# Patient Record
Sex: MALE | Race: BLACK OR AFRICAN AMERICAN | URBAN - METROPOLITAN AREA
[De-identification: names, ages, dates, MRNs, and addresses within clinical notes are randomized per-mention and may not be internally consistent; named-entity substitution may affect disease eponyms.]

---

## 2024-02-05 ENCOUNTER — LAB ENCOUNTER (OUTPATIENT)
Dept: LAB | Age: 54
End: 2024-02-05
Payer: COMMERCIAL

## 2024-02-05 ENCOUNTER — OFFICE VISIT (OUTPATIENT)
Dept: FAMILY MEDICINE CLINIC | Facility: CLINIC | Age: 54
End: 2024-02-05
Payer: COMMERCIAL

## 2024-02-05 VITALS
HEIGHT: 68 IN | SYSTOLIC BLOOD PRESSURE: 122 MMHG | WEIGHT: 187 LBS | TEMPERATURE: 97 F | HEART RATE: 85 BPM | DIASTOLIC BLOOD PRESSURE: 80 MMHG | OXYGEN SATURATION: 97 % | BODY MASS INDEX: 28.34 KG/M2

## 2024-02-05 DIAGNOSIS — R53.83 FATIGUE, UNSPECIFIED TYPE: ICD-10-CM

## 2024-02-05 DIAGNOSIS — Z12.5 PROSTATE CANCER SCREENING: ICD-10-CM

## 2024-02-05 DIAGNOSIS — F32.A ANXIETY AND DEPRESSION: ICD-10-CM

## 2024-02-05 DIAGNOSIS — Z00.00 ENCOUNTER FOR ANNUAL PHYSICAL EXAM: ICD-10-CM

## 2024-02-05 DIAGNOSIS — Z76.89 ENCOUNTER TO ESTABLISH CARE: Primary | ICD-10-CM

## 2024-02-05 DIAGNOSIS — F41.9 ANXIETY AND DEPRESSION: ICD-10-CM

## 2024-02-05 DIAGNOSIS — Z71.6 ENCOUNTER FOR SMOKING CESSATION COUNSELING: ICD-10-CM

## 2024-02-05 DIAGNOSIS — Z12.11 COLON CANCER SCREENING: ICD-10-CM

## 2024-02-05 LAB
ALBUMIN SERPL-MCNC: 4.5 G/DL (ref 3.2–4.8)
ALBUMIN/GLOB SERPL: 1.6 {RATIO} (ref 1–2)
ALP LIVER SERPL-CCNC: 50 U/L
ALT SERPL-CCNC: 31 U/L
ANION GAP SERPL CALC-SCNC: 6 MMOL/L (ref 0–18)
AST SERPL-CCNC: 29 U/L (ref ?–34)
BASOPHILS # BLD AUTO: 0.04 X10(3) UL (ref 0–0.2)
BASOPHILS NFR BLD AUTO: 1.1 %
BILIRUB SERPL-MCNC: 0.5 MG/DL (ref 0.3–1.2)
BUN BLD-MCNC: 12 MG/DL (ref 9–23)
BUN/CREAT SERPL: 10.3 (ref 10–20)
CALCIUM BLD-MCNC: 9.9 MG/DL (ref 8.7–10.4)
CHLORIDE SERPL-SCNC: 109 MMOL/L (ref 98–112)
CHOLEST SERPL-MCNC: 174 MG/DL (ref ?–200)
CO2 SERPL-SCNC: 29 MMOL/L (ref 21–32)
COMPLEXED PSA SERPL-MCNC: 0.44 NG/ML (ref ?–4)
CREAT BLD-MCNC: 1.16 MG/DL
DEPRECATED RDW RBC AUTO: 40 FL (ref 35.1–46.3)
EGFRCR SERPLBLD CKD-EPI 2021: 75 ML/MIN/1.73M2 (ref 60–?)
EOSINOPHIL # BLD AUTO: 0.09 X10(3) UL (ref 0–0.7)
EOSINOPHIL NFR BLD AUTO: 2.5 %
ERYTHROCYTE [DISTWIDTH] IN BLOOD BY AUTOMATED COUNT: 13.1 % (ref 11–15)
FASTING PATIENT LIPID ANSWER: YES
FASTING STATUS PATIENT QL REPORTED: YES
GLOBULIN PLAS-MCNC: 2.9 G/DL (ref 2.8–4.4)
GLUCOSE BLD-MCNC: 92 MG/DL (ref 70–99)
HCT VFR BLD AUTO: 44.2 %
HDLC SERPL-MCNC: 51 MG/DL (ref 40–59)
HGB BLD-MCNC: 14 G/DL
IMM GRANULOCYTES # BLD AUTO: 0.01 X10(3) UL (ref 0–1)
IMM GRANULOCYTES NFR BLD: 0.3 %
LDLC SERPL CALC-MCNC: 106 MG/DL (ref ?–100)
LYMPHOCYTES # BLD AUTO: 1.5 X10(3) UL (ref 1–4)
LYMPHOCYTES NFR BLD AUTO: 42 %
MCH RBC QN AUTO: 26.8 PG (ref 26–34)
MCHC RBC AUTO-ENTMCNC: 31.7 G/DL (ref 31–37)
MCV RBC AUTO: 84.7 FL
MONOCYTES # BLD AUTO: 0.31 X10(3) UL (ref 0.1–1)
MONOCYTES NFR BLD AUTO: 8.7 %
NEUTROPHILS # BLD AUTO: 1.62 X10 (3) UL (ref 1.5–7.7)
NEUTROPHILS # BLD AUTO: 1.62 X10(3) UL (ref 1.5–7.7)
NEUTROPHILS NFR BLD AUTO: 45.4 %
NONHDLC SERPL-MCNC: 123 MG/DL (ref ?–130)
OSMOLALITY SERPL CALC.SUM OF ELEC: 297 MOSM/KG (ref 275–295)
PLATELET # BLD AUTO: 179 10(3)UL (ref 150–450)
POTASSIUM SERPL-SCNC: 4.6 MMOL/L (ref 3.5–5.1)
PROT SERPL-MCNC: 7.4 G/DL (ref 5.7–8.2)
RBC # BLD AUTO: 5.22 X10(6)UL
SODIUM SERPL-SCNC: 144 MMOL/L (ref 136–145)
TRIGL SERPL-MCNC: 90 MG/DL (ref 30–149)
TSI SER-ACNC: 1.46 MIU/ML (ref 0.55–4.78)
VIT D+METAB SERPL-MCNC: 17.5 NG/ML (ref 30–100)
VLDLC SERPL CALC-MCNC: 15 MG/DL (ref 0–30)
WBC # BLD AUTO: 3.6 X10(3) UL (ref 4–11)

## 2024-02-05 PROCEDURE — 84443 ASSAY THYROID STIM HORMONE: CPT

## 2024-02-05 PROCEDURE — 36415 COLL VENOUS BLD VENIPUNCTURE: CPT

## 2024-02-05 PROCEDURE — 82306 VITAMIN D 25 HYDROXY: CPT

## 2024-02-05 PROCEDURE — 80061 LIPID PANEL: CPT

## 2024-02-05 PROCEDURE — 80053 COMPREHEN METABOLIC PANEL: CPT

## 2024-02-05 PROCEDURE — 85025 COMPLETE CBC W/AUTO DIFF WBC: CPT

## 2024-02-05 NOTE — PROGRESS NOTES
Dora Carrion is a 53 year old male.  Chief Complaint   Patient presents with    Physical     Here for annual physical    Behavioral Problem     Pt has been on a leave from work for anxiety and depression since 11/2023. Pt had things going on at home and family grievances and past trauma. Pt has been seeing a therapist sol parikh in [facility], [place].     HPI:   Dora Carrion presented to the clinic for annual physical examination.  With wife.  No acute concerns. No changes in family/personal history. Normal Sleep. Normal appetite. Balanced diet. Vegan diet. Normal BM/Urination. Physically active.  Gym multiple days per week.  Previously was .  No daily medications.    Patient with history of Anxiety and depression. Patient is following with therapist. Has been on leave of absence from work since 11/2023. Triggers - work, anniversary of loss of family members, anniversary of losing long term job. Not on daily medications. Not interested at this time.  Denies suicidal ideations or thoughts of harm.    No current outpatient medications on file.      Past Medical History:   Diagnosis Date    Anxiety     COVID     covid 2021 and 2023    Depression     Pneumonia       Past Surgical History:   Procedure Laterality Date    APPENDECTOMY      INGUINAL HERNIA REPAIR        Social History:  Social History     Socioeconomic History    Marital status:    Tobacco Use    Smoking status: Some Days     Types: Cigarettes, Cigars    Smokeless tobacco: Never   Vaping Use    Vaping Use: Never used   Substance and Sexual Activity    Alcohol use: Yes     Comment: occasionally    Drug use: Never    Sexual activity: Yes      History reviewed. No pertinent family history.   No Known Allergies     REVIEW OF SYSTEMS:   Review of Systems   Constitutional:  Negative for activity change.   HENT: Negative.     Eyes: Negative.    Respiratory:  Negative for chest tightness and shortness of breath.     Cardiovascular:  Negative for chest pain and palpitations.   Gastrointestinal:  Negative for abdominal pain, constipation and diarrhea.   Genitourinary: Negative.  Negative for difficulty urinating.   Musculoskeletal: Negative.    Skin: Negative.    Neurological: Negative.  Negative for dizziness and headaches.   Psychiatric/Behavioral:  Positive for sleep disturbance. Negative for self-injury and suicidal ideas. The patient is nervous/anxious.    All other systems reviewed and are negative.     Wt Readings from Last 5 Encounters:   02/05/24 187 lb (84.8 kg)     Body mass index is 28.43 kg/m².      EXAM:   /80 (BP Location: Right arm, Patient Position: Sitting, Cuff Size: adult)   Pulse 85   Temp 97.2 °F (36.2 °C) (Temporal)   Ht 5' 8\" (1.727 m)   Wt 187 lb (84.8 kg)   SpO2 97%   BMI 28.43 kg/m²   Physical Exam  Vitals and nursing note reviewed.   Constitutional:       Appearance: Normal appearance.   HENT:      Head: Normocephalic and atraumatic.      Right Ear: Tympanic membrane and external ear normal.      Left Ear: Tympanic membrane and external ear normal.      Mouth/Throat:      Mouth: Mucous membranes are moist.      Pharynx: Oropharynx is clear.   Eyes:      Conjunctiva/sclera: Conjunctivae normal.      Pupils: Pupils are equal, round, and reactive to light.   Cardiovascular:      Rate and Rhythm: Normal rate and regular rhythm.      Pulses: Normal pulses.      Heart sounds: Normal heart sounds.   Pulmonary:      Effort: Pulmonary effort is normal.      Breath sounds: No wheezing.   Abdominal:      General: Abdomen is flat. There is no distension.      Palpations: Abdomen is soft. There is no mass.      Tenderness: There is no abdominal tenderness. There is no guarding.      Hernia: No hernia is present.   Musculoskeletal:         General: Normal range of motion.      Cervical back: Normal range of motion.   Skin:     General: Skin is warm.   Neurological:      General: No focal deficit  present.      Mental Status: He is alert and oriented to person, place, and time.   Psychiatric:         Mood and Affect: Mood normal.         Behavior: Behavior normal.            ASSESSMENT AND PLAN:   (Z76.89) Encounter to establish care  (primary encounter diagnosis)  (Z00.00) Encounter for annual physical exam  Plan: CBC W Differential W Platelet [E], Lipid Panel         [E], TSH W Reflex To Free T4 [E], Comp         Metabolic Panel (14) [E]  -Due for influenza and shingles vaccine today.  Discussed risk versus benefit.  Declined.  - tobacco, alcohol, illicit drug use discouraged  - safe sexual practices advised  - Reinforced healthy diet, lifestyle, and exercise.  - Past Medical/Social/Family histories reviewed   - Regular dental visits recommended   - Regular eye exams recommended       (Z12.5) Prostate cancer screening  Plan: PSA (Screening) [E]        PSA completed today.  Further intervention pending results.    (Z12.11) Colon cancer screening  Plan: Gastro GI Telephone Colon Screen        Colon cancer screening discussed.  Prefers colonoscopy.  Order placed.  Advise schedule.    (F41.9,  F32.A) Anxiety and depression  Plan: Patient with history of anxiety and depression.  Follows with therapist.  Triggers-work, anniversary of family death, anniversary of 20 your job loss.  Declined pharmacological management-declined today.  Continue to follow with therapy.  Will follow-up desires pharmacological management in the future.  Denies suicidal ideations or thoughts self-harm.    (R53.83) Fatigue, unspecified type  Plan: Vitamin D [E]        Patient with history of fatigue.  Blood work completed today.  Advised sleep hygiene.  Further intervention pending results.    (Z71.6) Encounter for smoking cessation counseling  Plan: Tobacco Use Counseling 3-10 Min [32129]      Follow up in 1 year or sooner if needed  >30 minutes spent with patient.       The patient indicates understanding of these issues and agrees to  the plan.    This note was prepared using Dragon Medical voice recognition dictation software. As a result errors may occur. When identified these errors have been corrected. While every attempt is made to correct errors during dictation discrepancies may still exist.Tobacco Cessation Documentation (Smoking and Smokeless included):  I had an in depth therapy session with Dora Carrion about his tobacco use risks and options using the [employer]PSTF's Five A's approach:    Ask: Dora is using tobacco products.  Assess: We asked about/assessed behavioral health risk and factors affecting choice of behavior change goals/methods.  Specifically I asked about readiness to quit tobacco.  Advise: We gave clear, specific, and personalized behavior change advice, including information about personal health harms and benefits. Specifically, he was told that Quitting tobacco is one of the best things he can do for his health. I strongly encouraged him to quit.      Agree: We collaboratively selected appropriate treatment goals and methods based on the patient’s interest in and willingness to change the behavior.   Assist: We used behavior change techniques (self-help and/or counseling), to aid Dora in achieving agreed-upon goals by acquiring the skills, confidence, and social/environmental supports for behavior change, supplemented with adjunctive medical treatments when appropriate. Additionally, national quitting tobacco aides were discussed.   Arrange: Follow up at next visit- see specific follow up below.    Time Counseled: 3 minutes

## 2024-02-05 NOTE — PATIENT INSTRUCTIONS
Quitting Smoking    Quitting smoking is the most important step you can take to improve your health. We're glad you have set a goal to improve your health.    Quit Smoking Resources    In addition to medications, use the STAR plan to help you successfully quit.   Stick with your quit date!   Tell friends, family, and coworkers your quit date. Request their understanding and support.  Anticipate and prepare for challenges. Some examples are withdrawal symptoms, being around others who smoke, and drinking alcohol.  Remove all tobacco products and paraphernalia from your environment. Make your home and vehicles smoke-free.    Free resources for additional support:  National tobacco quitline: 1-800-QUIT-NOW (1-494.274.8316).  SmokefreeTXT is a free text program to assist you in quitting. Visit https://www.smokefree.gov/smokefreetxt for more information.  Feel free to call your care manager at ([phone]) for additional support.

## 2024-02-06 DIAGNOSIS — E55.9 VITAMIN D DEFICIENCY: Primary | ICD-10-CM

## 2024-02-06 RX ORDER — ERGOCALCIFEROL 1.25 MG/1
50000 CAPSULE ORAL WEEKLY
Qty: 12 CAPSULE | Refills: 0 | Status: SHIPPED | OUTPATIENT
Start: 2024-02-06

## 2024-02-09 ENCOUNTER — HOSPITAL ENCOUNTER (OUTPATIENT)
Dept: GENERAL RADIOLOGY | Age: 54
Discharge: HOME | End: 2024-02-09
Payer: COMMERCIAL

## 2024-02-09 ENCOUNTER — TELEMEDICINE (OUTPATIENT)
Dept: FAMILY MEDICINE CLINIC | Facility: CLINIC | Age: 54
End: 2024-02-09
Payer: COMMERCIAL

## 2024-02-09 DIAGNOSIS — F41.9 ANXIETY AND DEPRESSION: ICD-10-CM

## 2024-02-09 DIAGNOSIS — Z12.11 COLON CANCER SCREENING: ICD-10-CM

## 2024-02-09 DIAGNOSIS — R06.02 SHORTNESS OF BREATH: ICD-10-CM

## 2024-02-09 DIAGNOSIS — E55.9 VITAMIN D DEFICIENCY: ICD-10-CM

## 2024-02-09 DIAGNOSIS — R06.83 SNORING: ICD-10-CM

## 2024-02-09 DIAGNOSIS — F32.A ANXIETY AND DEPRESSION: ICD-10-CM

## 2024-02-09 DIAGNOSIS — R53.83 FATIGUE, UNSPECIFIED TYPE: Primary | ICD-10-CM

## 2024-02-09 DIAGNOSIS — R06.81 APNEA: ICD-10-CM

## 2024-02-09 PROCEDURE — 71045 X-RAY EXAM CHEST 1 VIEW: CPT

## 2024-02-09 NOTE — PROGRESS NOTES
Virtual Virtual Check-In    Dora Carrion verbally consents to a Virtual Video Check-In service on 02/09/24. Patient understands and accepts financial responsibility for any deductible, co-insurance and/or co-pays associated with this service. This visit is conducted using Telemedicine with live, interactive video and audio.     I spoke with Dora Carrion by secure video chat, verified date of birth, and discussed their current concerns:   Duration: 30 minutes     HPI  Dora Carrion presented via video visit for follow up blood work completed on 2/5/24.     Additional concern related to fatigue and intermittent shortness of breath. Intermittent. No known triggers or patterns. Associated snoring and apnea at night. No known TYRELL or recent sleep study. Denies chest pain, wheeze, dizziness. States declined in physical activity since October due to anxiety and depression. Following with therapist, working to get back into typical routine.     Review of Systems:   Review of Systems   Constitutional:  Positive for fatigue. Negative for fever.   Eyes:  Negative for visual disturbance.   Respiratory:  Positive for shortness of breath (intermittent). Negative for cough, chest tightness and wheezing.    Cardiovascular: Negative.  Negative for chest pain and palpitations.   Neurological: Negative.  Negative for dizziness and headaches.   Psychiatric/Behavioral:  Positive for sleep disturbance. Negative for self-injury and suicidal ideas. The patient is nervous/anxious.    All other systems reviewed and are negative.       Reviewed Active Problems:  There are no problems to display for this patient.     Reviewed Past Medical History:  Past Medical History:   Diagnosis Date    Anxiety     COVID     covid 2021 and 2023    Depression     Pneumonia       Reviewed Family History:  No family history on file.    Reviewed Social History:  Social History     Socioeconomic History    Marital status:     Tobacco Use    Smoking status: Some Days     Types: Cigarettes, Cigars    Smokeless tobacco: Never   Vaping Use    Vaping Use: Never used   Substance and Sexual Activity    Alcohol use: Yes     Comment: occasionally    Drug use: Never    Sexual activity: Yes      Reviewed Current Medications:  Current Outpatient Medications   Medication Sig Dispense Refill    ergocalciferol 1.25 MG (37542 UT) Oral Cap Take 1 capsule (50,000 Units total) by mouth once a week. 12 capsule 0          Physical Exam  There were no vitals filed for this visit.  Physical Exam  Vitals and nursing note reviewed.   Constitutional:       Appearance: Normal appearance.   HENT:      Head: Normocephalic and atraumatic.   Pulmonary:      Effort: Pulmonary effort is normal.   Neurological:      General: No focal deficit present.      Mental Status: He is alert and oriented to person, place, and time.   Psychiatric:         Mood and Affect: Mood normal.         Behavior: Behavior normal.            Diagnosis:  (R53.83) Fatigue, unspecified type  (primary encounter diagnosis)  (E55.9) Vitamin D deficiency  (F41.9,  F32.A) Anxiety and depression  (R06.81) Apnea  (R06.83) Snoring  Plan: DIAGOSTIC SLEEP STUDY, General sleep study  Plan: reviewed blood work from 2/5/24. Vitamin D deficiency. HX of anxiety and depression. Follows with therapist. Advised fatigue may be related to vitamin D deficiency, anxiety and depression, and/or TYRELL. Encouraged to get back into normal routine of physical activity. Continue to follow with therapist. Script for vitamin D was sent to pharmacy on 2/5/24. Referral placed for sleep study. Advised to schedule. Further intervention pending results.       (R06.02) Shortness of breath  Plan: XR CHEST AP/PA (1 VIEW) (CPT=71045)  Patient with intermittent shortness of breath x several weeks. Symptoms without pattern or known triggers. No red  flag symptoms. No adventitious lung sounds noted on last exam 2/5/24. Patient interested  in imaging. Unlikely diagnostic, but order placed. Most likely related to anxiety and depression. Further intervention pending results.     (Z12.11) Colon cancer screening  Plan: discussed colonoscopy. Has GI tele phone visit scheduled.     Follow up as needed   30 minutes     Please note that the following visit was completed using two-way, real-time interactive audio and/or video communication.  This has been done in good lee to provide continuity of care in the best interest of the provider-patient relationship, due to the ongoing public health crisis/national emergency and because of restrictions of visitation.  There are limitations of this visit as no physical exam could be performed.  Every conscious effort was taken to allow for sufficient and adequate time.  This billing was spent on reviewing labs, medications, radiology tests and decision making.  Appropriate medical decision-making and tests are ordered as detailed in the plan of care above. Dora Carrion understands video evaluation is not a substitute for in person examination or emergency care. Patient advised to go to ER or call 911 for worsening symptoms or acute distress.     This note was prepared using Dragon Medical voice recognition dictation software. As a result errors may occur. When identified these errors have been corrected. While every attempt is made to correct errors during dictation discrepancies may still exist.  TOMASA Faust

## 2024-02-13 ENCOUNTER — NURSE ONLY (OUTPATIENT)
Facility: CLINIC | Age: 54
End: 2024-02-13

## 2024-02-13 ENCOUNTER — PATIENT MESSAGE (OUTPATIENT)
Dept: FAMILY MEDICINE CLINIC | Facility: CLINIC | Age: 54
End: 2024-02-13

## 2024-02-13 DIAGNOSIS — Z12.11 SCREENING FOR COLON CANCER: Primary | ICD-10-CM

## 2024-02-13 RX ORDER — MULTIVIT-MIN/IRON FUM/FOLIC AC 7.5 MG-4
2 TABLET ORAL DAILY
COMMUNITY

## 2024-02-13 RX ORDER — MULTIVITAMIN WITH IRON
50 TABLET ORAL DAILY
COMMUNITY

## 2024-02-13 NOTE — PROGRESS NOTES
GI Staff:  TCS Colon Screening Orders    Please schedule: Colonoscopy 40247 / [id number] with MAC OR IV (if appropriate)    Please send split dose Golytely bowel prep     Diagnosis: Colon Screening Z12.11     Medication adjustments: Hold MVI, Vit B12, Ergocalciferol 1 week prior    >>>Please inform patient if new medications are started after scheduling procedure they need to call clinic to notify us.

## 2024-02-13 NOTE — PROGRESS NOTES
GI Schedulers    Please assist with scheduling colonoscopy utilizing written orders attached.     Thank you!    TCS successfully completed     Patient met TCS criteria to directly schedule colonoscopy:   Meets age criteria (45 to 65 years old)  Negative history for stroke, heart, lung &/or chronic kidney disease  Not taking insulin/blood thinners.

## 2024-02-13 NOTE — PROGRESS NOTES
Called patient for a scheduled telephone colon screening, name/ verified. Medications, pharmacy, and allergies verified with patient over the phone.   Please advise on colonoscopy orders and appropriate bowel prep.     Age 45-64 y/o (Y/N): Y  65-76 y/o ? Route telephone encounter to GI provider per rotation schedule   › GI MD preference: none  › Insurance:  [facility]  › Last PCP Visit:24  IF NO PCP within [facility] system ? Not TCS/FIT Eligible   › Last CBC drawn: 24  › H/W/BMI: 5'8\"/187lbs/28.44    Telephone colon screening Questionnaires:  Yes No   Are you currently experiencing any new GI symptoms? [] [x]   If yes, symptom details:     Rectal Bleeding with or without bowel movements: [] [x]   Black stool: [] [x]   Dysphagia & Food feeling/getting stuck: [] [x]   Intractable Vomiting: [] [x]   Unexplained weight loss: [] [x]   First colonoscopy? [] [x]   Family history of colon cancer? [] [x]   Any issues with anesthesia? [] [x]   If yes, explain details:      Personal history of Resp. Issues/Oxygen Use/TYRELL/COPD? [] [x]   If yes, with CPAP/BiPAP? [] []   History of devices Pacemaker/Defibrillator/Stents? [] [x]   History of Cardiac/CVA issues/(MI/Stroke):  [] [x]     Medication usage:  Yes  No   Anticoagulants:  Anticoagulant (Except Aspirin) ? Route to RN staff to obtain ordering provider orders [] [x]   Diabetic Meds:   PO DM Meds ? Hold day prior and day of procedure  Insulin ? Route to RN staff to obtain ordering provider orders  [] [x]   Weight loss meds (phentermine/Vyvanse/Saxsenda): [] [x]   Iron/Herbal/Multivitamin Supplement (RX/OTC): MVI, Vit B12, ergocalciferol [x] []   Usage of marijuana, CBD &/or vape products: [] [x]      Special comments/notes: Instructed patient to notify us after today for any changes with  insurance, medications and medical condition as these need to be reviewed so our gastroenterologist can perform procedure safely. GI office number provided. Patient verbalized understanding.

## 2024-02-13 NOTE — TELEPHONE ENCOUNTER
From: Dora Carrion  To: Hallie Hauser  Sent: 2/13/2024 9:15 AM CST  Subject: X-ray results     Ananth Sterling.     Hoping you’re well I received the test results. And I need your help. Would love to hear the review of the xray.     Thanks so much.

## 2024-02-16 NOTE — PROGRESS NOTES
Scheduled for:  Colonoscopy 04501  Provider Name:  Dr Rodriguez  Date:  5/22/2024  Location:  [facility]  Sedation:  MAC  Time:  10:00 am. (pt is aware to arrive at 9:00 am)  Prep:  Golytely  Meds/Allergies Reconciled?:  yes  Diagnosis with codes:    CRC screening Z12.11  Was patient informed to call insurance with codes (Y/N):  Yes  Referral sent?:  Referral was sent at the time of electronic surgical scheduling.  [facility] or [facility] notified?:  I sent an electronic request to Endo Scheduling and received a confirmation today.  Medication Orders:  Patient is aware to NOT take iron pills, herbal meds and diet supplements for 7 days before exam. Also to NOT take any form of alcohol, recreational drugs and any forms of ED meds 24-72 hours before exam.   Misc Orders:  N/A   Further instructions given by staff:  I discussed the prep instructions with the patient which he verbally understood. Patient is aware I will be sending prep instructions via My Chart.

## 2024-02-22 ENCOUNTER — PATIENT MESSAGE (OUTPATIENT)
Dept: FAMILY MEDICINE CLINIC | Facility: CLINIC | Age: 54
End: 2024-02-22

## 2024-02-24 DIAGNOSIS — E55.9 VITAMIN D DEFICIENCY: ICD-10-CM

## 2024-02-26 RX ORDER — ERGOCALCIFEROL 1.25 MG/1
50000 CAPSULE ORAL WEEKLY
Qty: 12 CAPSULE | Refills: 0 | OUTPATIENT
Start: 2024-02-26

## 2024-03-23 ENCOUNTER — E-VISIT (OUTPATIENT)
Dept: TELEHEALTH | Age: 54
End: 2024-03-23
Payer: COMMERCIAL

## 2024-03-23 DIAGNOSIS — Z02.9 ADMINISTRATIVE ENCOUNTER: Primary | ICD-10-CM
